# Patient Record
Sex: FEMALE | Race: WHITE | Employment: STUDENT
[De-identification: names, ages, dates, MRNs, and addresses within clinical notes are randomized per-mention and may not be internally consistent; named-entity substitution may affect disease eponyms.]

---
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
## 2020-10-21 ENCOUNTER — HOSPITAL ENCOUNTER (OUTPATIENT)
Dept: PHYSICAL THERAPY | Age: 20
Discharge: HOME | End: 2020-10-21
Payer: COMMERCIAL

## 2020-10-21 PROCEDURE — 97110 THERAPEUTIC EXERCISES: CPT

## 2020-10-21 PROCEDURE — 97161 PT EVAL LOW COMPLEX 20 MIN: CPT

## 2020-10-21 NOTE — PROGRESS NOTES
Lisa Manzo  : 2000  Primary: [facility]*  Secondary:  [address] @ [address], Himanshu Phillips.  [phone]   [phone]      OUTPATIENT PHYSICAL THERAPY: Daily Treatment Note  10/21/2020   Treatment Diagnosis: Low back pain (M54.5)  Effective Dates: 10/21/2020 TO 2021 (90 days). Frequency/Duration: 3 times a week for 90 Days  GOALS: (Goals have been discussed and agreed upon with patient.)  Short-Term Functional Goals: Time Frame: 2 weeks  1. Patient will be independent with HEP. 2. Patient will centralize pain as noted by resolution of symptoms through the posterior hip. Discharge Goals: Time Frame: 4 weeks  1. Patient will be able to run for 30 minutes without pain to return to prior level of function. 2. Patient will be able to perform lateral plank for 70 seconds to normalize lateral trunk and hip strength for return to prior level of function. 3. Patient will report no pain with daily activity. _________________________________________________________________________  Pre-treatment Symptoms/Complaints:  See initial evaluation. Pain: Initial: 10/10 Post Session:  10/10   Medications Last Reviewed:  10/21/2020  Updated Objective Findings:  Below measures from initial evaluation unless otherwise noted. Observation/Orthostatic Postural Assessment:    No deformity or atrophy is noted. Gait is symmetric. Palpation:    Tender to QL and paraspinals on the left, tender to L glute.   ROM:    Toe touch: fingers to floor (pt notes this is reduced relative to her normal baseline)   Trunk extension is WNL in standing, full but painful in prone  Lateral flexion is normal to the L, normal but reported as \"tight\" to the R  Hip IR: 40 ° L with provocation on overpressure, 40 ° R  Hip ER: 30 ° L; 45 ° R  Hip extension: 20 ° B with pain on overpressure L  SLR: 70 ° L; 90 ° R   Piriformis length is restricted R Strength:   myotomal testing shows weakness of plantarflexors on the L  Hip abduction: 4/5 L; 5/5 R  Hip extension: 4-/5 with pain L; 4+/5 R  Hip ER: 5/5 B  Special Tests:    Slump is sensitized with ankle DF; SLR is sensitized with add/df. Neurological Screen:  Reflexes are WNL, sensation is WNL  Patient Specific Functional Scale:   1. Running: 10/10  2. Lifting weights: 8/10  3. Jumping rope: 8/10     TREATMENT:   THERAPEUTIC ACTIVITY: ( see below for minutes): Therapeutic activities per grid below to improve mobility, strength, balance and coordination. Required minimal visual, verbal, manual and tactile cues to improve independence and safety with daily activities . THERAPEUTIC EXERCISE: (see below for minutes):  Exercises per grid below to improve mobility, strength, balance and coordination. Required minimal verbal and manual cues to promote proper body alignment, promote proper body posture and promote proper body mechanics. Progressed resistance, range, repetitions and complexity of movement as indicated. MANUAL THERAPY: (see below for minutes): Joint mobilization and Soft tissue mobilization was utilized and necessary because of the patient's restricted joint motion, painful spasm, loss of articular motion and restricted motion of soft tissue. MODALITIES: (see below for minutes):      to decrease pain    SELF CARE: (see below for minutes): Procedure(s) (per grid) utilized to improve and/or restore self-care/home management as related to dressing, bathing and grooming. Required minimal verbal cueing to facilitate activities of daily living skills and compensatory activities.      Date: 10/21/20       Modalities:                                Therapeutic Exercise: 25 min        natasha pose [id number] intermittently throughout the day       Cat-camel 10x through comfortable range       Bird dog [id number]       Hip abduction 3x10-20       Sciatic mobs        Lateral planks        Hip extension Proprioceptive Activities:                                Manual Therapy:                        Therapeutic Activities:                                  HEP: see 10/21/20 flow sheet for specifics. [employer] Portal  Treatment/Session Summary:    · Response to Treatment:  Patient is independent with performance of above described home program.  · Communication/Consultation:  None today  · Equipment provided today:  None today  · Recommendations/Intent for next treatment session: Next visit will focus on relief of pain and return to prior level of function.     Total Treatment Billable Duration:  45 minutes  PT Patient Time In/Time Out  Time In: 9020  Time Out: [address], DPT    Future Appointments   Date Time Provider Gely Catherine   10/23/2020 10:15 AM Kerrie Dakin, PTA [facility]   10/26/2020  2:00 PM Kerrie Dakin, PTA [facility]   10/28/2020  9:30 AM May Hem, DPT [facility]   10/30/2020  2:45 PM Kerrie Dakin, PTA [facility]   11/2/2020 11:45 AM May Hem, DPT [facility]   11/4/2020 11:45 AM May Hem, DPT [facility]   11/6/2020 11:45 AM May Hem, [facility]   11/9/2020 11:45 AM May Hem, [facility]   11/11/2020 11:45 AM May Hem, DPT [facility]   11/13/2020 11:45 AM May Hem, DPT [facility]   11/16/2020 11:45 AM May Hem, DPT [facility]   11/18/2020 10:15 AM May Hem, DPT [facility]   11/20/2020 11:45 AM May Hem, DPT [facility]

## 2020-10-21 NOTE — THERAPY EVALUATION
Romayne Rand  : 2000 [address] [facility], [address] [place] [address]  [phone]   [phone]        OUTPATIENT PHYSICAL THERAPY:Initial Assessment 10/21/2020         ICD-10: Treatment Diagnosis: Low back pain (M54.5)  Precautions/Allergies:   Patient has no allergy information on record. Fall Risk Score:     Ambulatory/Rehab Services H2 Model Falls Risk Assessment    Risk Factors:       No Risk Factors Identified Ability to Rise from Chair:       (0)  Ability to rise in a single movement    Falls Prevention Plan:       No modifications necessary   Total: (5 or greater = High Risk): 0     [employer]. All Rights Reserved. [facility] Patent #[id number]. Federal Law prohibits the replication, distribution or use without written permission from [employer]     MD Orders: evaluate and treat MEDICAL/REFERRING DIAGNOSIS:  Lumbar radiculopathy [M54.16]   DATE OF ONSET: approx 20  REFERRING PHYSICIAN: Janice Burgos MD  RETURN PHYSICIAN APPOINTMENT: 10/28/20     INITIAL ASSESSMENT:  Ms. Merced Beltre presents to therapy with left sided low back pain with radiculopathy. This is classified as responding to stabilization training. She has impaired trunk extension and hip extension secondary to pain. She has weakness through the lateral hip musculature and segmental trunk musculature. Symptoms are highly irritable and provoked with impact activities such as running and jumping. Skilled therapy is required to return to prior level of function. PROBLEM LIST (Impacting functional limitations):  1. Decreased Strength  2. Decreased ADL/Functional Activities  3. Decreased Ambulation Ability/Technique  4. Increased Pain  5. Decreased Activity Tolerance  6. Decreased Flexibility/Joint Mobility INTERVENTIONS PLANNED:  1. Family Education  2. Home Exercise Program (HEP)  3. Manual Therapy  4.  Neuromuscular Re-education/Strengthening  5. Range of Motion (ROM)  6. Therapeutic Activites  7. Therapeutic Exercise/Strengthening  8. modalities    TREATMENT PLAN:  Effective Dates: 10/21/2020 TO 1/20/2021 (90 days). Frequency/Duration: 3 times a week for 90 Days  GOALS: (Goals have been discussed and agreed upon with patient.)  Short-Term Functional Goals: Time Frame: 2 weeks  1. Patient will be independent with HEP. 2. Patient will centralize pain as noted by resolution of symptoms through the posterior hip. Discharge Goals: Time Frame: 4 weeks  1. Patient will be able to run for 30 minutes without pain to return to prior level of function. 2. Patient will be able to perform lateral plank for 70 seconds to normalize lateral trunk and hip strength for return to prior level of function. 3. Patient will report no pain with daily activity. Rehabilitation Potential For Stated Goals: Excellent  Regarding Dewane July therapy, I certify that the treatment plan above will be carried out by a therapist or under their direction. Thank you for this referral,  Dotty Alpers, DPT       Referring Physician Signature: Didi Johnson MD              Date                      HISTORY:   History of Present Injury/Illness (Reason for Referral):  Patient presents to therapy with primary complaint of left sided low back pain. Initial symptoms began approximately 4 weeks ago. She had been having increasing back soreness, particularly with running, but she had significantly increased pain following playing golf. She describes the pain as shooting that is present from the lateral side of L3 to the gluteal fold on the L. This causes difficulty with walking and is significantly irritated with trying to run or exercise on the elliptical. She has no pain with sitting or laying down and eases with rest. Symptoms have improved with rest for the past 2 weeks, a course of steroids, and a back brace.    Past Medical History/Comorbidities: Ms. Neely Never  has no past medical history on file. Ms. Neely Never  has no past surgical history on file. 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 Prior Level of Function/Work/Activity:  Active individual. Enjoys running for exercise. Dominant Side:         RIGHT  Current Medications:  No current outpatient medications on file. Date Last Reviewed:  10/21/2020   # of Personal Factors/Comorbidities that affect the Plan of Care: 0: LOW COMPLEXITY   EXAMINATION:   Observation/Orthostatic Postural Assessment:    No deformity or atrophy is noted. Gait is symmetric. Palpation:    Tender to QL and paraspinals on the left, tender to L glute. ROM:    Toe touch: fingers to floor (pt notes this is reduced relative to her normal baseline)   Trunk extension is WNL in standing, full but painful in prone  Lateral flexion is normal to the L, normal but reported as \"tight\" to the R  Hip IR: 40 ° L with provocation on overpressure, 40 ° R  Hip ER: 30 ° L; 45 ° R  Hip extension: 20 ° B with pain on overpressure L  SLR: 70 ° L; 90 ° R   Piriformis length is restricted R   Strength:   myotomal testing shows weakness of plantarflexors on the L  Hip abduction: 4/5 L; 5/5 R  Hip extension: 4-/5 with pain L; 4+/5 R  Hip ER: 5/5 B  Special Tests:    Slump is sensitized with ankle DF; SLR is sensitized with add/df. Neurological Screen:  Reflexes are WNL, sensation is WNL  Functional Mobility:   WNL  Balance: WNL  Patient Specific Functional Scale:   1. Running: 10/10  2. Lifting weights: 8/10  3. Jumping rope: 8/10   Body Structures Involved:  1. Nerves  2. Bones  3. Joints  4. Muscles  5. Ligaments Body Functions Affected:  1. Sensory/Pain  2. Neuromusculoskeletal  3. Movement Related Activities and Participation Affected:  1. General Tasks and Demands  2. Mobility  3. Self Care  4. Domestic Life  5. Interpersonal Interactions and Relationships  6.  Community, Social and [place]   # of elements that affect the Plan of Care: 1-2: LOW COMPLEXITY   CLINICAL PRESENTATION:   Presentation: Stable and uncomplicated: LOW COMPLEXITY   CLINICAL DECISION MAKING:   Tool Used: Modified Oswestry Low Back Pain Questionnaire  Score:  Initial: 13/50  Most Recent: X/50 (Date: -- )   Interpretation of Score: Each section is scored on a 0-5 scale, 5 representing the greatest disability. The scores of each section are added together for a total score of 50. Medical Necessity:   · Patient is expected to demonstrate progress in strength, range of motion, balance and coordination  ·  to increase independence with community mobility and return to prior level of function. · .  Reason for Services/Other Comments:  · Patient has demonstrated an improvement in functional level by independent performance of HEP.    · .   Use of outcome tool(s) and clinical judgement create a POC that gives a: Clear prediction of patient's progress: LOW COMPLEXITY     Total Treatment Duration:  PT Patient Time In/Time Out  Time In: 1145  Time Out: 45 Mari Rios, DPT

## 2020-10-23 ENCOUNTER — HOSPITAL ENCOUNTER (OUTPATIENT)
Dept: PHYSICAL THERAPY | Age: 20
Discharge: HOME | End: 2020-10-23
Payer: COMMERCIAL

## 2020-10-23 PROCEDURE — 97110 THERAPEUTIC EXERCISES: CPT

## 2020-10-23 NOTE — PROGRESS NOTES
Ileana Weir  : 2000  Primary: [facility] torie Clarke*  Secondary:  [address]  [phone]   [phone]      OUTPATIENT PHYSICAL THERAPY: Daily Treatment Note  10/23/2020   Treatment Diagnosis: Low back pain (M54.5)  Effective Dates: 10/21/2020 TO 2021 (90 days). Frequency/Duration: 3 times a week for 90 Days  GOALS: (Goals have been discussed and agreed upon with patient.)  Short-Term Functional Goals: Time Frame: 2 weeks  1. Patient will be independent with HEP. 2. Patient will centralize pain as noted by resolution of symptoms through the posterior hip. Discharge Goals: Time Frame: 4 weeks  1. Patient will be able to run for 30 minutes without pain to return to prior level of function. 2. Patient will be able to perform lateral plank for 70 seconds to normalize lateral trunk and hip strength for return to prior level of function. 3. Patient will report no pain with daily activity. _________________________________________________________________________  Pre-treatment Symptoms/Complaints: Pt reports pain with running. She was advised to hold off on running for now and any exercises that exacerbate her pain. Pain: Initial: 3/10 gluteus maggie Post Session:  No increase   Medications Last Reviewed:  10/23/2020  Updated Objective Findings:  Below measures from initial evaluation unless otherwise noted. Observation/Orthostatic Postural Assessment:    No deformity or atrophy is noted. Gait is symmetric. Palpation:    Tender to QL and paraspinals on the left, tender to L glute.   ROM:    Toe touch: fingers to floor (pt notes this is reduced relative to her normal baseline)   Trunk extension is WNL in standing, full but painful in prone  Lateral flexion is normal to the L, normal but reported as \"tight\" to the R  Hip IR: 40 ° L with provocation on overpressure, 40 ° R  Hip ER: 30 ° L; 45 ° R  Hip extension: 20 ° B with pain on overpressure L  SLR: 70 ° L; 90 ° R   Piriformis length is restricted R   Strength:   myotomal testing shows weakness of plantarflexors on the L  Hip abduction: 4/5 L; 5/5 R  Hip extension: 4-/5 with pain L; 4+/5 R  Hip ER: 5/5 B  Special Tests:    Slump is sensitized with ankle DF; SLR is sensitized with add/df. Neurological Screen:  Reflexes are WNL, sensation is WNL  Patient Specific Functional Scale:   1. Running: 10/10  2. Lifting weights: 8/10  3. Jumping rope: 8/10     TREATMENT:   THERAPEUTIC ACTIVITY: ( see below for minutes): Therapeutic activities per grid below to improve mobility, strength, balance and coordination. Required minimal visual, verbal, manual and tactile cues to improve independence and safety with daily activities . THERAPEUTIC EXERCISE: (see below for minutes):  Exercises per grid below to improve mobility, strength, balance and coordination. Required minimal verbal and manual cues to promote proper body alignment, promote proper body posture and promote proper body mechanics. Progressed resistance, range, repetitions and complexity of movement as indicated. MANUAL THERAPY: (see below for minutes): Joint mobilization and Soft tissue mobilization was utilized and necessary because of the patient's restricted joint motion, painful spasm, loss of articular motion and restricted motion of soft tissue. MODALITIES: (see below for minutes):      to decrease pain    SELF CARE: (see below for minutes): Procedure(s) (per grid) utilized to improve and/or restore self-care/home management as related to dressing, bathing and grooming. Required minimal verbal cueing to facilitate activities of daily living skills and compensatory activities.      Date: 10/21/20 10/23/20 (visit 2)      Modalities:                                Therapeutic Exercise: 25 min  35 min      natasha pose [id number] intermittently throughout the day 10 sec hold x 3      Cat-camel 10x through comfortable range x15      Bird dog [id number] repeat      Lateral band walk  2 laps silver      Slant board stretch  1 min hold on level 2      Hip abduction 3x10-20 2x10 B      Sciatic mobs  x15 supine to L LE      Lateral planks  20 sec hold x3      Hip extension   2x10 B LE      Pelvic tilt  x15 supine      Dead bug  2x10      Proprioceptive Activities:                                Manual Therapy:                        Therapeutic Activities:                                  HEP: see 10/21/20 flow sheet for specifics. [facility] Portal  Treatment/Session Summary:    · Response to Treatment: Pt did not report increased pain with exercise and she demonstrate good body mechanics with abdominal stabilization activities. Continue POC. · Communication/Consultation:  None today  · Equipment provided today:  None today  · Recommendations/Intent for next treatment session: Next visit will focus on relief of pain and return to prior level of function.     Total Treatment Billable Duration:  35 minutes (pt was a few min late because her advisor meeting ran over)  PT Patient Time In/Time Out  Time In: 1025  Time Out: 2555 Carson Blake PTA    Future Appointments   Date Time Provider Gely Catherine   10/26/2020  2:00 PM Bright Pena [facility]   10/28/2020  9:30 AM Lodema Wei, DPT [facility]   10/30/2020  2:45 PM Yoana Delacruz PTA [facility]   11/2/2020 11:45 AM Lodema Wei, DPT [facility]   11/4/2020 11:45 AM Lodema Wei, [facility]   11/6/2020 11:45 AM Lodema Wei, DPT [facility]   11/9/2020 11:45 AM Lodema Wei, DPT [facility]   11/11/2020 11:45 AM Lodema Wei, DPT [facility]   11/13/2020 11:45 AM Lodema Wei, DPT [facility]   11/16/2020 11:45 AM Lodema Ewi, DPT [facility]   11/18/2020 10:15 AM Lodema Wei, DPT [facility]   11/20/2020 11:45 AM Michael Garcia Tod Mart, DPT [facility]

## 2020-10-26 ENCOUNTER — HOSPITAL ENCOUNTER (OUTPATIENT)
Dept: PHYSICAL THERAPY | Age: 20
Discharge: HOME | End: 2020-10-26
Payer: COMMERCIAL

## 2020-10-26 PROCEDURE — 97110 THERAPEUTIC EXERCISES: CPT

## 2020-10-26 NOTE — PROGRESS NOTES
Latonya Cervantes  : 2000  Primary: Eva Jung Of [facility] Clarke*  Secondary:  [address] @ [address]  [phone]   [phone]      OUTPATIENT PHYSICAL THERAPY: Daily Treatment Note  10/26/2020   Treatment Diagnosis: Low back pain (M54.5)  Effective Dates: 10/21/2020 TO 2021 (90 days). Frequency/Duration: 3 times a week for 90 Days  GOALS: (Goals have been discussed and agreed upon with patient.)  Short-Term Functional Goals: Time Frame: 2 weeks  1. Patient will be independent with HEP. 2. Patient will centralize pain as noted by resolution of symptoms through the posterior hip. Discharge Goals: Time Frame: 4 weeks  1. Patient will be able to run for 30 minutes without pain to return to prior level of function. 2. Patient will be able to perform lateral plank for 70 seconds to normalize lateral trunk and hip strength for return to prior level of function. 3. Patient will report no pain with daily activity. _________________________________________________________________________  Pre-treatment Symptoms/Complaints: Pt states \"I have a little bit of pain. \"    Pt has a MD appointment 10/28/20. Pain: Initial: 1-2/10 gluteus maggie Post Session:  No increase   Medications Last Reviewed:  10/26/2020  Updated Objective Findings:  Below measures from initial evaluation unless otherwise noted. Observation/Orthostatic Postural Assessment:    No deformity or atrophy is noted. Gait is symmetric. Palpation:    Tender to QL and paraspinals on the left, tender to L glute.   ROM:    Toe touch: fingers to floor (pt notes this is reduced relative to her normal baseline)   Trunk extension is WNL in standing, full but painful in prone  Lateral flexion is normal to the L, normal but reported as \"tight\" to the R  Hip IR: 40 ° L with provocation on overpressure, 40 ° R  Hip ER: 30 ° L; 45 ° R  Hip extension: 20 ° B with pain on overpressure L  SLR: 70 ° L; 90 ° R   Piriformis length is restricted R   Strength:   myotomal testing shows weakness of plantarflexors on the L  Hip abduction: 4/5 L; 5/5 R  Hip extension: 4-/5 with pain L; 4+/5 R  Hip ER: 5/5 B  Special Tests:    Slump is sensitized with ankle DF; SLR is sensitized with add/df. Neurological Screen:  Reflexes are WNL, sensation is WNL  Patient Specific Functional Scale:   1. Running: 10/10  2. Lifting weights: 8/10  3. Jumping rope: 8/10     TREATMENT:   THERAPEUTIC ACTIVITY: ( see below for minutes): Therapeutic activities per grid below to improve mobility, strength, balance and coordination. Required minimal visual, verbal, manual and tactile cues to improve independence and safety with daily activities . THERAPEUTIC EXERCISE: (see below for minutes):  Exercises per grid below to improve mobility, strength, balance and coordination. Required minimal verbal and manual cues to promote proper body alignment, promote proper body posture and promote proper body mechanics. Progressed resistance, range, repetitions and complexity of movement as indicated. MANUAL THERAPY: (see below for minutes): Joint mobilization and Soft tissue mobilization was utilized and necessary because of the patient's restricted joint motion, painful spasm, loss of articular motion and restricted motion of soft tissue. MODALITIES: (see below for minutes):      to decrease pain    SELF CARE: (see below for minutes): Procedure(s) (per grid) utilized to improve and/or restore self-care/home management as related to dressing, bathing and grooming. Required minimal verbal cueing to facilitate activities of daily living skills and compensatory activities.      Date: 10/21/20 10/23/20 (visit 2) 10/26/20 (visit 3)     Modalities:                                Therapeutic Exercise: 25 min  35 min 45 min     natasha pose [id number] intermittently throughout the day 10 sec hold x 3      Bridges with glute squeeze 2x10     Cat-camel 10x through comfortable range x15 x15     Treadmill    5 min 3.3 mph     Bird dog [id number] repeat 10 sec hold x10 B     Lateral band walk  2 laps silver 2 laps thick green     Heel raises   x30     Slant board stretch  1 min hold on level 2 1 min hold on level 2     Clamshells in side lying   x30 B green band     Hip abduction 3x10-20 2x10 B 3x10 B     Sciatic mobs  x15 supine to L LE x15 L LE     Lateral planks  20 sec hold x3 20 sec hold x6 B     Hip extension   2x10 B LE      Pelvic tilt  x15 supine x15 supine     Dead bug  2x10      Proprioceptive Activities:                                Manual Therapy:                        Therapeutic Activities:                                  HEP: see 10/21/20 flow sheet for specifics. [employer] Portal  Treatment/Session Summary:    · Response to Treatment: Pt did not report increased pain with bridging when she was cued to contract the gluteus maggie. Pt required minimal cueing for correct body mechanics. Continue POC. · Communication/Consultation:  None today  · Equipment provided today:  None today  · Recommendations/Intent for next treatment session: Next visit will focus on relief of pain and return to prior level of function.     Total Treatment Billable Duration:  45 minutes      Steven Rosas PTA    Future Appointments   Date Time Provider Gely Catherine   10/26/2020  2:00 PM [facility]   10/28/2020  9:30 AM [facility]z [facility]   10/30/2020  2:45 PM Jackie Lima PTA [facility]   11/2/2020 11:45 AM Elisha Montez DPT [facility]   11/4/2020 11:45 AM Elisha Montez DPT [facility]   11/6/2020 11:45 AM Elisha Montez DPT [facility]   11/9/2020 11:45 AM Elisha Montez DPT [facility]   11/11/2020 11:45 AM Elisha Montez DPT [facility]   11/13/2020 11:45 AM Elisha Montez DPT [facility]   11/16/2020 11:45 AM Rukhsana Smith [facility]   11/18/2020 10:15 AM LEIGH ANN [facility]   11/20/2020 11:45 AM [facility]

## 2020-10-28 ENCOUNTER — HOSPITAL ENCOUNTER (OUTPATIENT)
Dept: PHYSICAL THERAPY | Age: 20
Discharge: HOME | End: 2020-10-28
Payer: COMMERCIAL

## 2020-10-28 PROCEDURE — 97110 THERAPEUTIC EXERCISES: CPT

## 2020-10-28 NOTE — PROGRESS NOTES
Augustus Dawosn  : 2000  Primary: Thomas Noguera Of [place] Tricia*  Secondary:  Reji Gan @ [address], [address] [place] [address]  [phone]   [phone]      OUTPATIENT PHYSICAL THERAPY: Daily Treatment Note  10/28/2020   Treatment Diagnosis: Low back pain (M54.5)  Effective Dates: 10/21/2020 TO 2021 (90 days). Frequency/Duration: 3 times a week for 90 Days  GOALS: (Goals have been discussed and agreed upon with patient.)  Short-Term Functional Goals: Time Frame: 2 weeks  1. Patient will be independent with HEP. 2. Patient will centralize pain as noted by resolution of symptoms through the posterior hip. Discharge Goals: Time Frame: 4 weeks  1. Patient will be able to run for 30 minutes without pain to return to prior level of function. 2. Patient will be able to perform lateral plank for 70 seconds to normalize lateral trunk and hip strength for return to prior level of function. 3. Patient will report no pain with daily activity. _________________________________________________________________________  Pre-treatment Symptoms/Complaints: running remains provocative. Feeling less discomfort with peak of 3/10 since previous session. Pt has a MD appointment 10/28/20. Pain: Initial: 1-2/10 gluteus maggie Post Session:  No increase   Medications Last Reviewed:  10/28/2020  Updated Objective Findings:  Below measures from initial evaluation unless otherwise noted. Observation/Orthostatic Postural Assessment:    No deformity or atrophy is noted. Gait is symmetric. Palpation:    Tender to QL and paraspinals on the left, tender to L glute.   ROM:    Toe touch: fingers to floor (pt notes this is reduced relative to her normal baseline)   Trunk extension is WNL in standing, full but painful in prone  Lateral flexion is normal to the L, normal but reported as \"tight\" to the R  Hip IR: 40 ° L with provocation on overpressure, 40 ° R  Hip ER: 30 ° L; 45 ° R  Hip extension: 20 ° B with pain on overpressure L  SLR: 70 ° L; 90 ° R   Piriformis length is restricted R   Strength:   myotomal testing shows weakness of plantarflexors on the L  Hip abduction: 4/5 L; 5/5 R  Hip extension: 4-/5 with pain L; 4+/5 R  Hip ER: 5/5 B  Special Tests:    Slump is sensitized with ankle DF; SLR is sensitized with add/df. Neurological Screen:  Reflexes are WNL, sensation is WNL  Patient Specific Functional Scale:   1. Running: 10/10  2. Lifting weights: 8/10  3. Jumping rope: 8/10     TREATMENT:   THERAPEUTIC ACTIVITY: ( see below for minutes): Therapeutic activities per grid below to improve mobility, strength, balance and coordination. Required minimal visual, verbal, manual and tactile cues to improve independence and safety with daily activities . THERAPEUTIC EXERCISE: (see below for minutes):  Exercises per grid below to improve mobility, strength, balance and coordination. Required minimal verbal and manual cues to promote proper body alignment, promote proper body posture and promote proper body mechanics. Progressed resistance, range, repetitions and complexity of movement as indicated. MANUAL THERAPY: (see below for minutes): Joint mobilization and Soft tissue mobilization was utilized and necessary because of the patient's restricted joint motion, painful spasm, loss of articular motion and restricted motion of soft tissue. MODALITIES: (see below for minutes):      to decrease pain    SELF CARE: (see below for minutes): Procedure(s) (per grid) utilized to improve and/or restore self-care/home management as related to dressing, bathing and grooming. Required minimal verbal cueing to facilitate activities of daily living skills and compensatory activities.      Date: 10/21/20 10/23/20 (visit 2) 10/26/20 (visit 3) 10/28/20 (visit 4)    Modalities:                                Therapeutic Exercise: 25 min  35 min 45 min 45 min     natasha pose [id number] intermittently throughout the day 10 sec hold x 3      Bridges with glute squeeze   2x10 3x10 (attempted single leg but was painful)    Cat-camel 10x through comfortable range x15 x15 Progressed to hand to heel rock/prone cobra    Treadmill    5 min 3.3 mph     Bird dog [id number] repeat 10 sec hold x10 B [id number] B    Lateral band walk  2 laps silver 2 laps thick green x3 laps green loop; band kick backs 3x10 B     Heel raises   x30     Slant board stretch  1 min hold on level 2 1 min hold on level 2     Clamshells in side lying   x30 B green band 3x10 in SL     Hip abduction 3x10-20 2x10 B 3x10 B     Sciatic mobs  x15 supine to L LE x15 L LE     Lateral planks  20 sec hold x3 20 sec hold x6 B [id number] B; prone planks from toes [id number]    Hip extension   2x10 B LE  Prone 3x10 cueing glute activation    Pelvic tilt  x15 supine x15 supine     Lunge    Reverse with slider 3x8 B    Dead bug  2x10      Proprioceptive Activities:                                Manual Therapy:                        Therapeutic Activities:                                  HEP: see 10/21/20 flow sheet for specifics. [employer] Portal  Treatment/Session Summary:    · Response to Treatment: See MD note. Will continue to progress with stabilization training. · Communication/Consultation:  None today  · Equipment provided today:  None today  · Recommendations/Intent for next treatment session: Next visit will focus on relief of pain and return to prior level of function.     Total Treatment Billable Duration:  45 minutes   PT Patient Time In/Time Out  Time In: 0930  Time Out: 185 Chad Smith, DPT    Future Appointments   Date Time Provider Gely Catherine   10/30/2020  2:45 PM Bright Calles [facility]   11/2/2020 11:45 AM Deisy Gonzalez DPT [facility]   11/4/2020 11:45 AM [facility]   11/6/2020 11:45 AM Deisy Gonzalez DPT [facility]   11/9/2020 11:45 AM Erin Michaud Michael Coffey, DPT [facility]   11/11/2020 11:45 AM May Hem, DPT [facility]   11/13/2020 11:45 AM May Hem, DPT [facility]   11/16/2020 11:45 AM May Hem, DPT [facility]   11/18/2020 10:15 AM May Hem, DPT [facility]   11/20/2020 11:45 AM May Hem, DPT [facility]

## 2020-10-28 NOTE — PROGRESS NOTES
Cristiano Lee   (DUL:[birth year]) [address], [facility].  [phone]   [phone]           PHYSICIAN COMMUNICATION    REFERRING PHYSICIAN: Juve Thorne MD  Return Physician Appointment: 10/28/20  MEDICAL/REFERRING DIAGNOSIS:  · Lumbar radiculopathy [M54.16]  ATTENDANCE: Cristiano Lee has attended 4 out of 4 visits, with 0 cancellation(s) and 0 no shows. ASSESSMENT:  DATE: 10/28/2020    PROGRESS: Cristiano Lee has been progressing well with therapy with decreased symptom irritability and centralization. Treatment has consisted primarily of stabilization training through the core and glute musculature. She continues to have provocation of symptoms during motions such as returning to neutral from an extended position (moving from a prone cobra yoga position to a quadruped position) and she continues to be unable to participate with running due to pain. RECOMMENDATIONS: Plan to continue with therapy, progressing with strengthening and stabilization activities.      Thank you for this referral,  MORENO FrederickT

## 2020-10-30 ENCOUNTER — HOSPITAL ENCOUNTER (OUTPATIENT)
Dept: PHYSICAL THERAPY | Age: 20
Discharge: HOME | End: 2020-10-30
Payer: COMMERCIAL

## 2020-10-30 PROCEDURE — 97110 THERAPEUTIC EXERCISES: CPT
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
## 2020-11-04 ENCOUNTER — HOSPITAL ENCOUNTER (OUTPATIENT)
Dept: PHYSICAL THERAPY | Age: 20
Discharge: HOME | End: 2020-11-04

## 2020-11-04 NOTE — PROGRESS NOTES
Flora Sarmiento : 2000 Primary: [facility] Zonia Clarke* Secondary:  [id number] [facility] @ [address] [place] [address] [phone]   [phone] OUTPATIENT PHYSICAL THERAPY: Daily Treatment Note  2020 Treatment Diagnosis: Low back pain (M54.5) Effective Dates: 10/21/2020 TO 2021 (90 days). Frequency/Duration: 3 times a week for 90 Days GOALS: (Goals have been discussed and agreed upon with patient.) Short-Term Functional Goals: Time Frame: 2 weeks 1. Patient will be independent with HEP. 2. Patient will centralize pain as noted by resolution of symptoms through the posterior hip. Discharge Goals: Time Frame: 4 weeks 1. Patient will be able to run for 30 minutes without pain to return to prior level of function. 2. Patient will be able to perform lateral plank for 70 seconds to normalize lateral trunk and hip strength for return to prior level of function. 3. Patient will report no pain with daily activity. _________________________________________________________________________ Pre-treatment Symptoms/Complaints: ***. Pain: Initial: 0/10  Post Session:  No increase Medications Last Reviewed:  2020 Updated Objective Findings:  Below measures from initial evaluation unless otherwise noted. Observation/Orthostatic Postural Assessment:   
No deformity or atrophy is noted. Gait is symmetric. Palpation:   
Tender to QL and paraspinals on the left, tender to L glute. ROM:   
Toe touch: fingers to floor (pt notes this is reduced relative to her normal baseline) Trunk extension is WNL in standing, full but painful in prone Lateral flexion is normal to the L, normal but reported as \"tight\" to the R Hip IR: 40 ° L with provocation on overpressure, 40 ° R Hip ER: 30 ° L; 45 ° R Hip extension: 20 ° B with pain on overpressure L 
SLR: 70 ° L; 90 ° R Piriformis length is restricted R Strength:  
 myotomal testing shows weakness of plantarflexors on the L Hip abduction: 4/5 L; 5/5 R Hip extension: 4-/5 with pain L; 4+/5 R Hip ER: 5/5 B Special Tests:   
Slump is sensitized with ankle DF; SLR is sensitized with add/df. Neurological Screen: 
Reflexes are WNL, sensation is WNL Patient Specific Functional Scale: 1. Running: 10/10 2. Lifting weights: 8/10 3. Jumping rope: 8/10 TREATMENT:  
THERAPEUTIC ACTIVITY: ( see below for minutes): Therapeutic activities per grid below to improve mobility, strength, balance and coordination. Required minimal visual, verbal, manual and tactile cues to improve independence and safety with daily activities . THERAPEUTIC EXERCISE: (see below for minutes):  Exercises per grid below to improve mobility, strength, balance and coordination. Required minimal verbal and manual cues to promote proper body alignment, promote proper body posture and promote proper body mechanics. Progressed resistance, range, repetitions and complexity of movement as indicated. MANUAL THERAPY: (see below for minutes): Joint mobilization and Soft tissue mobilization was utilized and necessary because of the patient's restricted joint motion, painful spasm, loss of articular motion and restricted motion of soft tissue. MODALITIES: (see below for minutes):      to decrease pain SELF CARE: (see below for minutes): Procedure(s) (per grid) utilized to improve and/or restore self-care/home management as related to dressing, bathing and grooming. Required minimal verbal cueing to facilitate activities of daily living skills and compensatory activities. Date: 10/21/20 10/23/20 (visit 2) 10/26/20 (visit 3) 10/28/20 (visit 4) 10/30/20 (visit 5) 11/4/20 (visit 6) Modalities:         
         
         
         
Therapeutic Exercise: 25 min  35 min 45 min 45 min  45 min ***   
natasha pose [id number] intermittently throughout the day 10 sec hold x 3       
 Bridges with glute squeeze   2x10 3x10 (attempted single leg but was painful) 3x10 Cat-camel 10x through comfortable range x15 x15 Progressed to hand to heel rock/prone cobra Repeat x10 Treadmill    5 min 3.3 mph Bird dog [id number] repeat 10 sec hold x10 B [id number] B Lateral band walk  2 laps silver 2 laps thick green x3 laps green loop; band kick backs 3x10 B  x3 laps knees flexed thick green band and 3x10 B hip ext in standing Heel raises   x30 Hamstring stretch with strap     30 sec hold x 4 B Slant board stretch  1 min hold on level 2 1 min hold on level 2 Clamshells in side lying   x30 B green band 3x10 in SL  3x10 green band in SL Hip abduction 3x10-20 2x10 B 3x10 B  3x10 B LE in side lying Sciatic mobs  x15 supine to L LE x15 L LE Lateral planks  20 sec hold x3 20 sec hold x6 B [id number] B; prone planks from toes [id number] Hip extension   2x10 B LE  Prone 3x10 cueing glute activation repeat Pelvic tilt  x15 supine x15 supine Lunge    Reverse with slider 3x8 B 3x10 reverse with slider Lower trunk rotation     x30 small rotations Dead bug  2x10   2x10 Proprioceptive Activities:         
         
         
         
Manual Therapy:         
         
         
Therapeutic Activities: HEP: see 10/21/20 flow sheet for specifics. [employer] [facility] 
Treatment/Session Summary: · Response to Treatment: Pt did not report any pain with activity. She was educated on the signs and symptoms of a concussion (dizziness, vision changes, headache and disorientation) and advised to go the ER if symptoms worsen. Pt verbalized understanding. Continue POC, 
· Communication/Consultation:  None today · Equipment provided today:  None today · Recommendations/Intent for next treatment session: Next visit will focus on relief of pain and return to prior level of function. Total Treatment Billable Duration:  45 minutes Nichole Nowak, DPT Future Appointments Date Time Provider Gely Dorene 11/4/2020  2:00 PM Tito Massa, DPT [facility]  
11/6/2020 11:45 AM Tito Massa, DPT [facility]  
11/9/2020 11:45 AM Tito Massa, DPT [facility]  
11/11/2020 11:45 AM Tito Massa, DPT [facility]  
11/13/2020 11:45 AM Tito Massa, DPT [facility]  
11/16/2020 11:45 AM Tito Massa, DPT [facility]  
11/18/2020 10:15 AM Tito Massa, DPT [facility]  
11/20/2020 11:45 AM Tito Massa, DPT [facility]

## 2020-11-06 ENCOUNTER — HOSPITAL ENCOUNTER (OUTPATIENT)
Dept: PHYSICAL THERAPY | Age: 20
Discharge: HOME | End: 2020-11-06

## 2020-11-16 ENCOUNTER — APPOINTMENT (OUTPATIENT)
Dept: PHYSICAL THERAPY | Age: 20
End: 2020-11-16

## 2020-11-18 ENCOUNTER — APPOINTMENT (OUTPATIENT)
Dept: PHYSICAL THERAPY | Age: 20
End: 2020-11-18

## 2020-11-20 ENCOUNTER — APPOINTMENT (OUTPATIENT)
Dept: PHYSICAL THERAPY | Age: 20
End: 2020-11-20

## (undated) DEVICE — YANKAUER SUCTION INSTRUMENT NO CONTROL VENT, BULB TIP, CLEAR: Brand: YANKAUER

## (undated) DEVICE — TOWEL OR BLU 16X26 STRL

## (undated) DEVICE — PROXIMATE SKIN STAPLERS (35 WIDE) CONTAINS 35 STAINLESS STEEL STAPLES (FIXED HEAD): Brand: PROXIMATE

## (undated) DEVICE — 3M™ BAIR HUGGER® UNDERBODY BLANKET, FULL ACCESS, 10 PER CASE 63500: Brand: BAIR HUGGER™

## (undated) DEVICE — SUTURE VICRYL 2-0 FS-1

## (undated) DEVICE — INTENDED FOR TISSUE SEPARATION, AND OTHER PROCEDURES THAT REQUIRE A SHARP SURGICAL BLADE TO PUNCTURE OR CUT.: Brand: BARD-PARKER ® STAINLESS STEEL BLADES

## (undated) DEVICE — USE ITEM #176901

## (undated) DEVICE — SYSTEM PAIN PMP 4ML/HR ONQ

## (undated) DEVICE — DECANTER SPIKE TRANSFLOW

## (undated) DEVICE — DRESSING BIOPATCH 1X4 CNTR

## (undated) DEVICE — DRAIN RESERVOIR RELIAVAC 100CC

## (undated) DEVICE — SUTURE PDS II 2-0 CT-2

## (undated) DEVICE — APPLICATOR COTTON TIP 6\" 2/PK

## (undated) DEVICE — COVER SGL STRL LGHT HNDL BLU

## (undated) DEVICE — TRAY SRGPRP PVP IOD WT SCRB SM

## (undated) DEVICE — CONTAINER SPEC STR 4OZ GRY LID

## (undated) DEVICE — BIPOLAR FORCEPS CORD,BANANA LEADS: Brand: VALLEYLAB

## (undated) DEVICE — KENDALL SCD EXPRESS SLEEVES, KNEE LENGTH, MEDIUM: Brand: KENDALL SCD

## (undated) DEVICE — VEST SRG 2 SM CUP R/L

## (undated) DEVICE — ADHESIVE MASTISOL 2/3CC VL

## (undated) DEVICE — 3M™ STERI-STRIP™ REINFORCED ADHESIVE SKIN CLOSURES, R1547, 1/2 IN X 4 IN (12 MM X 100 MM), 6 STRIPS/ENVELOPE: Brand: 3M™ STERI-STRIP™

## (undated) DEVICE — NEEDLE SPINAL 20X3-1/2 YELLOW

## (undated) DEVICE — DRAIN INCS 7MM 20CMX7MM SIL

## (undated) DEVICE — SYRINGE MNJCT 10ML LF STRL REG

## (undated) DEVICE — POUCH: SSEAL TYVEK 2000/CS: Brand: MEDICAL ACTION INDUSTRIES

## (undated) DEVICE — GOWN SURG AERO BLUE PERF LG

## (undated) DEVICE — SOL  .9 1000ML BTL

## (undated) DEVICE — 20 ML SYRINGE LUER-LOCK TIP: Brand: MONOJECT

## (undated) DEVICE — 3M™ TEGADERM™ TRANSPARENT FILM DRESSING, 1626W, 4 IN X 4-3/4 IN (10 CM X 12 CM), 50 EACH/CARTON, 4 CARTON/CASE: Brand: 3M™ TEGADERM™

## (undated) DEVICE — BANDAGE ROLL,100% COTTON, 6 PLY, LARGE: Brand: KERLIX

## (undated) DEVICE — SUTURE ETHILON 3-0 669H

## (undated) DEVICE — STERILE LATEX POWDER-FREE SURGICAL GLOVESWITH NITRILE COATING: Brand: PROTEXIS

## (undated) DEVICE — SUTURE PROLENE 4-0 FS-2

## (undated) DEVICE — MAJOR GENERAL: Brand: MEDLINE INDUSTRIES, INC.

## (undated) DEVICE — UNDYED BRAIDED (POLYGLACTIN 910), SYNTHETIC ABSORBABLE SUTURE: Brand: COATED VICRYL

## (undated) DEVICE — TUBING IRR SET LYSONIX

## (undated) DEVICE — SUCTION CANISTER, 3000CC,SAFELINER: Brand: DEROYAL

## (undated) DEVICE — SPONGE LAP 18X18 XRAY STRL

## (undated) DEVICE — Device: Brand: JELCO

## (undated) NOTE — IP AVS SNAPSHOT
[facility]    [address], [place], [place] ~ [phone]                Discharge Summary   5/30/2017    Tom Coker           Admission Information        Provider Department    5/30/2017 Micah Bearden MD [facility] the next 24 hours. · To experience mild discomforts such as sore lip or tongue, headache, cramps, gas pains or a bloated feeling in your abdomen. · To experience mild back pain or soreness for a day or two if you had spinal or epidural anesthesia.    · Do not remove any plastic adhesive dressings covering AYSHA drain skin sites or pain pump sites. May remove garment briefly if needed, then replace promptly. Begin antibiotics tonight as instructed; Pain medication as needed for pain.   Ambulate and deep gabriela Department Dept Phone    5/30/2017  9:54 AM [facility] [phone]         We want to hear from you       We want to hear from you, please share your experience with us by returning the survey you will receive in the mail. Thank you!         Bhargav Traore